# Patient Record
Sex: MALE | Race: WHITE | NOT HISPANIC OR LATINO | Employment: OTHER | URBAN - METROPOLITAN AREA
[De-identification: names, ages, dates, MRNs, and addresses within clinical notes are randomized per-mention and may not be internally consistent; named-entity substitution may affect disease eponyms.]

---

## 2020-01-13 ENCOUNTER — APPOINTMENT (EMERGENCY)
Dept: RADIOLOGY | Facility: HOSPITAL | Age: 35
End: 2020-01-13
Payer: COMMERCIAL

## 2020-01-13 ENCOUNTER — HOSPITAL ENCOUNTER (EMERGENCY)
Facility: HOSPITAL | Age: 35
Discharge: HOME | End: 2020-01-13
Attending: EMERGENCY MEDICINE | Admitting: EMERGENCY MEDICINE
Payer: COMMERCIAL

## 2020-01-13 VITALS
OXYGEN SATURATION: 97 % | WEIGHT: 180 LBS | SYSTOLIC BLOOD PRESSURE: 132 MMHG | DIASTOLIC BLOOD PRESSURE: 71 MMHG | RESPIRATION RATE: 16 BRPM | BODY MASS INDEX: 25.83 KG/M2 | TEMPERATURE: 98.1 F | HEART RATE: 87 BPM

## 2020-01-13 DIAGNOSIS — J10.1 INFLUENZA B: Primary | ICD-10-CM

## 2020-01-13 LAB
ALBUMIN SERPL BCP-MCNC: 3.8 G/DL (ref 3.5–5)
ALP SERPL-CCNC: 47 U/L (ref 46–116)
ALT SERPL W P-5'-P-CCNC: 28 U/L (ref 12–78)
ANION GAP SERPL CALCULATED.3IONS-SCNC: 7 MMOL/L (ref 4–13)
AST SERPL W P-5'-P-CCNC: 25 U/L (ref 5–45)
BASOPHILS # BLD AUTO: 0.04 THOUSANDS/ΜL (ref 0–0.1)
BASOPHILS NFR BLD AUTO: 1 % (ref 0–1)
BILIRUB SERPL-MCNC: 0.28 MG/DL (ref 0.2–1)
BUN SERPL-MCNC: 16 MG/DL (ref 5–25)
CALCIUM SERPL-MCNC: 8.9 MG/DL (ref 8.3–10.1)
CHLORIDE SERPL-SCNC: 105 MMOL/L (ref 100–108)
CO2 SERPL-SCNC: 26 MMOL/L (ref 21–32)
CREAT SERPL-MCNC: 1.1 MG/DL (ref 0.6–1.3)
EOSINOPHIL # BLD AUTO: 0.02 THOUSAND/ΜL (ref 0–0.61)
EOSINOPHIL NFR BLD AUTO: 0 % (ref 0–6)
ERYTHROCYTE [DISTWIDTH] IN BLOOD BY AUTOMATED COUNT: 12.8 % (ref 11.6–15.1)
FLUAV RNA NPH QL NAA+PROBE: ABNORMAL
FLUBV RNA NPH QL NAA+PROBE: DETECTED
GFR SERPL CREATININE-BSD FRML MDRD: 86 ML/MIN/1.73SQ M
GLUCOSE SERPL-MCNC: 121 MG/DL (ref 65–140)
HCT VFR BLD AUTO: 37.3 % (ref 36.5–49.3)
HGB BLD-MCNC: 12.9 G/DL (ref 12–17)
IMM GRANULOCYTES # BLD AUTO: 0.01 THOUSAND/UL (ref 0–0.2)
IMM GRANULOCYTES NFR BLD AUTO: 0 % (ref 0–2)
LYMPHOCYTES # BLD AUTO: 0.5 THOUSANDS/ΜL (ref 0.6–4.47)
LYMPHOCYTES NFR BLD AUTO: 9 % (ref 14–44)
MCH RBC QN AUTO: 31.5 PG (ref 26.8–34.3)
MCHC RBC AUTO-ENTMCNC: 34.6 G/DL (ref 31.4–37.4)
MCV RBC AUTO: 91 FL (ref 82–98)
MONOCYTES # BLD AUTO: 0.94 THOUSAND/ΜL (ref 0.17–1.22)
MONOCYTES NFR BLD AUTO: 17 % (ref 4–12)
NEUTROPHILS # BLD AUTO: 3.9 THOUSANDS/ΜL (ref 1.85–7.62)
NEUTS SEG NFR BLD AUTO: 73 % (ref 43–75)
NRBC BLD AUTO-RTO: 0 /100 WBCS
PLATELET # BLD AUTO: 218 THOUSANDS/UL (ref 149–390)
PMV BLD AUTO: 11 FL (ref 8.9–12.7)
POTASSIUM SERPL-SCNC: 3.9 MMOL/L (ref 3.5–5.3)
PROT SERPL-MCNC: 7.1 G/DL (ref 6.4–8.2)
RBC # BLD AUTO: 4.09 MILLION/UL (ref 3.88–5.62)
RSV RNA NPH QL NAA+PROBE: ABNORMAL
SODIUM SERPL-SCNC: 138 MMOL/L (ref 136–145)
WBC # BLD AUTO: 5.41 THOUSAND/UL (ref 4.31–10.16)

## 2020-01-13 PROCEDURE — 36415 COLL VENOUS BLD VENIPUNCTURE: CPT | Performed by: PHYSICIAN ASSISTANT

## 2020-01-13 PROCEDURE — 96360 HYDRATION IV INFUSION INIT: CPT

## 2020-01-13 PROCEDURE — 87631 RESP VIRUS 3-5 TARGETS: CPT | Performed by: PHYSICIAN ASSISTANT

## 2020-01-13 PROCEDURE — 71046 X-RAY EXAM CHEST 2 VIEWS: CPT

## 2020-01-13 PROCEDURE — 85025 COMPLETE CBC W/AUTO DIFF WBC: CPT | Performed by: PHYSICIAN ASSISTANT

## 2020-01-13 PROCEDURE — 99283 EMERGENCY DEPT VISIT LOW MDM: CPT

## 2020-01-13 PROCEDURE — 99284 EMERGENCY DEPT VISIT MOD MDM: CPT | Performed by: PHYSICIAN ASSISTANT

## 2020-01-13 PROCEDURE — 87040 BLOOD CULTURE FOR BACTERIA: CPT | Performed by: PHYSICIAN ASSISTANT

## 2020-01-13 PROCEDURE — 80053 COMPREHEN METABOLIC PANEL: CPT | Performed by: PHYSICIAN ASSISTANT

## 2020-01-13 RX ORDER — OSELTAMIVIR PHOSPHATE 75 MG/1
75 CAPSULE ORAL 2 TIMES DAILY
Qty: 10 CAPSULE | Refills: 0 | Status: SHIPPED | OUTPATIENT
Start: 2020-01-13 | End: 2020-01-13 | Stop reason: SDUPTHER

## 2020-01-13 RX ORDER — ONDANSETRON 4 MG/1
4 TABLET, ORALLY DISINTEGRATING ORAL EVERY 8 HOURS PRN
Qty: 15 TABLET | Refills: 0 | Status: SHIPPED | OUTPATIENT
Start: 2020-01-13

## 2020-01-13 RX ORDER — ONDANSETRON 4 MG/1
4 TABLET, ORALLY DISINTEGRATING ORAL EVERY 8 HOURS PRN
Qty: 15 TABLET | Refills: 0 | Status: SHIPPED | OUTPATIENT
Start: 2020-01-13 | End: 2020-01-13 | Stop reason: SDUPTHER

## 2020-01-13 RX ORDER — OSELTAMIVIR PHOSPHATE 75 MG/1
75 CAPSULE ORAL 2 TIMES DAILY
Qty: 10 CAPSULE | Refills: 0 | Status: SHIPPED | OUTPATIENT
Start: 2020-01-13 | End: 2020-01-18

## 2020-01-13 RX ADMIN — SODIUM CHLORIDE 1000 ML: 0.9 INJECTION, SOLUTION INTRAVENOUS at 08:28

## 2020-01-13 NOTE — ED PROVIDER NOTES
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  No known exposure to the flu  History provided by:  Patient and spouse   used: No    URI   Presenting symptoms: congestion, cough, fever and sore throat    Presenting symptoms: no ear pain, no fatigue and no rhinorrhea    Severity:  Moderate  Onset quality:  Gradual  Duration:  1 day  Timing:  Intermittent  Progression:  Waxing and waning  Chronicity:  New  Relieved by:  OTC medications  Associated symptoms: no arthralgias, no headaches, no myalgias, no neck pain, no sinus pain, no sneezing, no swollen glands and no wheezing        None       History reviewed  No pertinent past medical history  Past Surgical History:   Procedure Laterality Date    ABDOMINAL SURGERY      EYE SURGERY      SPLENECTOMY, TOTAL         History reviewed  No pertinent family history  I have reviewed and agree with the history as documented  Social History     Tobacco Use    Smoking status: Never Smoker    Smokeless tobacco: Never Used   Substance Use Topics    Alcohol use: Yes     Alcohol/week: 4 0 standard drinks     Types: 4 Standard drinks or equivalent per week     Comment: week    Drug use: No        Review of Systems   Constitutional: Positive for fever  Negative for chills and fatigue  HENT: Positive for congestion and sore throat  Negative for ear pain, rhinorrhea, sinus pain and sneezing  Respiratory: Positive for cough  Negative for wheezing  Cardiovascular: Negative for chest pain  Musculoskeletal: Negative for arthralgias, back pain, myalgias and neck pain  Skin: Negative for rash and wound  Neurological: Negative for headaches  All other systems reviewed and are negative  Physical Exam  Physical Exam   Constitutional: He is oriented to person, place, and time  He appears well-developed and well-nourished  No distress  HENT:   Head: Normocephalic and atraumatic  Right Ear: External ear normal    Left Ear: External ear normal    Mouth/Throat: Posterior oropharyngeal erythema present  No oropharyngeal exudate  Tonsils are 1+ on the right  Tonsils are 1+ on the left  No tonsillar exudate  Eyes: Conjunctivae are normal    Neck: No JVD present  No tracheal deviation present  Cardiovascular: Normal rate, regular rhythm and normal heart sounds  Exam reveals no gallop and no friction rub  No murmur heard  Pulmonary/Chest: Effort normal and breath sounds normal  No respiratory distress  He has no wheezes  He has no rales  He exhibits no tenderness  Abdominal: Soft  Bowel sounds are normal  He exhibits no distension  There is no tenderness  There is no guarding  Musculoskeletal: Normal range of motion  He exhibits no edema, tenderness or deformity  Lumbar back: He exhibits normal range of motion, no tenderness, no bony tenderness, no swelling, no edema, no deformity, no laceration, no pain and no spasm  Lymphadenopathy:     He has no cervical adenopathy  Neurological: He is alert and oriented to person, place, and time  Skin: Skin is warm and dry  No rash noted  He is not diaphoretic  No erythema  Psychiatric: He has a normal mood and affect  His behavior is normal    Nursing note and vitals reviewed        Vital Signs  ED Triage Vitals   Temperature Pulse Respirations Blood Pressure SpO2   01/13/20 0752 01/13/20 0753 01/13/20 0753 01/13/20 0753 01/13/20 0753   98 1 °F (36 7 °C) 87 16 132/71 97 %      Temp src Heart Rate Source Patient Position - Orthostatic VS BP Location FiO2 (%)   -- 01/13/20 0753 01/13/20 0753 01/13/20 0753 --    Monitor Lying Right arm       Pain Score       01/13/20 0753       8           Vitals:    01/13/20 0753   BP: 132/71   Pulse: 87   Patient Position - Orthostatic VS: Lying         Visual Acuity      ED Medications  Medications   sodium chloride 0 9 % bolus 1,000 mL (0 mL Intravenous Stopped 1/13/20 0933)       Diagnostic Studies  Results Reviewed     Procedure Component Value Units Date/Time    Blood culture #1 [[id number]] Collected:  01/13/20 2914    Lab Status:  Preliminary result Specimen:  Blood from Arm, Left Updated:  01/13/20 1401     Blood Culture Received in Microbiology Lab  Culture in Progress  Blood culture #2 [[id number]] Collected:  01/13/20 0827    Lab Status:  Preliminary result Specimen:  Blood from Arm, Right Updated:  01/13/20 1401     Blood Culture Received in Microbiology Lab  Culture in Progress      Influenza A/B and RSV PCR [[id number]]  (Abnormal) Collected:  01/13/20 0827    Lab Status:  Final result Specimen:  Nasopharyngeal Swab Updated:  01/13/20 0907     INFLUENZA A PCR None Detected     INFLUENZA B PCR Detected     RSV PCR None Detected    Comprehensive metabolic panel [[id number]] Collected:  01/13/20 0827    Lab Status:  Final result Specimen:  Blood from Arm, Right Updated:  01/13/20 0904     Sodium 138 mmol/L      Potassium 3 9 mmol/L      Chloride 105 mmol/L      CO2 26 mmol/L      ANION GAP 7 mmol/L      BUN 16 mg/dL      Creatinine 1 10 mg/dL      Glucose 121 mg/dL      Calcium 8 9 mg/dL      AST 25 U/L      ALT 28 U/L      Alkaline Phosphatase 47 U/L      Total Protein 7 1 g/dL      Albumin 3 8 g/dL      Total Bilirubin 0 28 mg/dL      eGFR 86 ml/min/1 73sq m     Narrative:       [place] guidelines for Chronic Kidney Disease (CKD):     Stage 1 with normal or high GFR (GFR > 90 mL/min/1 73 square meters)    Stage 2 Mild CKD (GFR = 60-89 mL/min/1 73 square meters)    Stage 3A Moderate CKD (GFR = 45-59 mL/min/1 73 square meters)    Stage 3B Moderate CKD (GFR = 30-44 mL/min/1 73 square meters)    Stage 4 Severe CKD (GFR = 15-29 mL/min/1 73 square meters)    Stage 5 End Stage CKD (GFR <15 mL/min/1 73 square meters)  Note: GFR calculation is accurate only with a steady state creatinine    CBC and differential [[id number]]  (Abnormal) Collected:  01/13/20 0827    Lab Status:  Final result Specimen:  Blood from Arm, Right Updated:  01/13/20 0835     WBC 5 41 Thousand/uL      RBC 4 09 Million/uL      Hemoglobin 12 9 g/dL      Hematocrit 37 3 %      MCV 91 fL      MCH 31 5 pg      MCHC 34 6 g/dL      RDW 12 8 %      MPV 11 0 fL      Platelets 895 Thousands/uL      nRBC 0 /100 WBCs      Neutrophils Relative 73 %      Immat GRANS % 0 %      Lymphocytes Relative 9 %      Monocytes Relative 17 %      Eosinophils Relative 0 %      Basophils Relative 1 %      Neutrophils Absolute 3 90 Thousands/µL      Immature Grans Absolute 0 01 Thousand/uL      Lymphocytes Absolute 0 50 Thousands/µL      Monocytes Absolute 0 94 Thousand/µL      Eosinophils Absolute 0 02 Thousand/µL      Basophils Absolute 0 04 Thousands/µL                  XR chest 2 views   Final Result by Mignon Lepe MD (01/13 1714)      No acute cardiopulmonary disease  Workstation performed: [id number]                    Procedures  Procedures         ED Course                               MDM  Number of Diagnoses or Management Options  Influenza B:   Diagnosis management comments: Differential diagnosis includes but not limited to: Influenza, upper respiratory infection, bronchitis, pneumonia    Sepsis less likely         Amount and/or Complexity of Data Reviewed  Clinical lab tests: reviewed and ordered  Tests in the radiology section of CPT®: reviewed and ordered  Independent visualization of images, tracings, or specimens: yes          Disposition  Final diagnoses:   Influenza B     Time reflects when diagnosis was documented in both MDM as applicable and the Disposition within this note     Time User Action Codes Description Comment    1/13/2020  9:30 AM Ca Ordonez Add [J10 1] Influenza B       ED Disposition     ED Disposition Condition Date/Time Comment    Discharge Stable Mon Jan 13, 2020  9:30 AM Ronel Reddy discharge to home/self care  Follow-up Information    None         Discharge Medication List as of 1/13/2020  9:31 AM      START taking these medications    Details   ondansetron (ZOFRAN ODT) 4 mg disintegrating tablet Take 1 tablet (4 mg total) by mouth every 8 (eight) hours as needed for nausea for up to 15 doses, Starting Mon 1/13/2020, Normal      oseltamivir (TAMIFLU) 75 mg capsule Take 1 capsule (75 mg total) by mouth 2 (two) times a day for 5 days, Starting Mon 1/13/2020, Until Sat 1/18/2020, Normal           No discharge procedures on file      ED Provider  Electronically Signed by           Sandra Hadley PA-C  01/13/20 9012

## 2020-01-15 ENCOUNTER — HOSPITAL ENCOUNTER (EMERGENCY)
Facility: HOSPITAL | Age: 35
Discharge: HOME | End: 2020-01-15
Attending: EMERGENCY MEDICINE | Admitting: EMERGENCY MEDICINE
Payer: COMMERCIAL

## 2020-01-15 VITALS
HEIGHT: 71 IN | HEART RATE: 64 BPM | WEIGHT: 180 LBS | RESPIRATION RATE: 17 BRPM | SYSTOLIC BLOOD PRESSURE: 125 MMHG | TEMPERATURE: 99.3 F | BODY MASS INDEX: 25.2 KG/M2 | DIASTOLIC BLOOD PRESSURE: 66 MMHG | OXYGEN SATURATION: 98 %

## 2020-01-15 DIAGNOSIS — E86.0 DEHYDRATION: ICD-10-CM

## 2020-01-15 DIAGNOSIS — J11.1 INFLUENZA: Primary | ICD-10-CM

## 2020-01-15 PROCEDURE — 99283 EMERGENCY DEPT VISIT LOW MDM: CPT

## 2020-01-15 PROCEDURE — 96366 THER/PROPH/DIAG IV INF ADDON: CPT

## 2020-01-15 PROCEDURE — 99283 EMERGENCY DEPT VISIT LOW MDM: CPT | Performed by: EMERGENCY MEDICINE

## 2020-01-15 PROCEDURE — 96365 THER/PROPH/DIAG IV INF INIT: CPT

## 2020-01-15 RX ADMIN — SODIUM CHLORIDE, SODIUM LACTATE, POTASSIUM CHLORIDE, AND CALCIUM CHLORIDE 2000 ML: .6; .31; .03; .02 INJECTION, SOLUTION INTRAVENOUS at 18:54

## 2020-01-17 NOTE — ED PROVIDER NOTES
History  Chief Complaint   Patient presents with    Flu Symptoms     pt states was here on Mon was told if to come back if is dehydrated and still has fever     This is a 37 y o  old male who presents to the ED for evaluation of weakness  Was diagnosed with flu  She does have fever, weakness  States decreased p o  Intake, feels dehydrated  Was told to come back if he felt worse  Cough is at baseline  No shortness of breath or chest pain no nausea vomiting or diarrhea  Is on Tamiflu  Prior to Admission Medications   Prescriptions Last Dose Informant Patient Reported? Taking?   ondansetron (ZOFRAN ODT) 4 mg disintegrating tablet   No No   Sig: Take 1 tablet (4 mg total) by mouth every 8 (eight) hours as needed for nausea for up to 15 doses   oseltamivir (TAMIFLU) 75 mg capsule   No No   Sig: Take 1 capsule (75 mg total) by mouth 2 (two) times a day for 5 days      Facility-Administered Medications: None     History reviewed  No pertinent past medical history  Past Surgical History:   Procedure Laterality Date    ABDOMINAL SURGERY      EYE SURGERY      SPLENECTOMY, TOTAL       History reviewed  No pertinent family history  I have reviewed and agree with the history as documented  Social History     Tobacco Use    Smoking status: Never Smoker    Smokeless tobacco: Never Used   Substance Use Topics    Alcohol use: Yes     Alcohol/week: 4 0 standard drinks     Types: 4 Standard drinks or equivalent per week     Comment: week    Drug use: No      Review of Systems   Constitutional: Positive for fatigue and fever  Negative for chills and unexpected weight change  HENT: Negative for congestion, rhinorrhea and sore throat  Eyes: Negative for redness and visual disturbance  Respiratory: Positive for cough  Negative for shortness of breath  Cardiovascular: Negative for chest pain and leg swelling  Gastrointestinal: Negative for abdominal pain, constipation, diarrhea, nausea and vomiting  Endocrine: Negative for cold intolerance and heat intolerance  Genitourinary: Negative for dysuria, frequency and urgency  Musculoskeletal: Negative for back pain  Skin: Negative for rash  Neurological: Negative for dizziness, syncope and numbness  All other systems reviewed and are negative  Physical Exam  Physical Exam   Constitutional: He is oriented to person, place, and time  He appears well-developed and well-nourished  No distress  HENT:   Head: Normocephalic and atraumatic  Nose: Nose normal    Eyes: Pupils are equal, round, and reactive to light  Conjunctivae and EOM are normal    Neck: Normal range of motion  Neck supple  Cardiovascular: Normal rate, regular rhythm and normal heart sounds  Exam reveals no gallop  No murmur heard  Pulmonary/Chest: Effort normal and breath sounds normal  No respiratory distress  He has no wheezes  He has no rales  Abdominal: Soft  Bowel sounds are normal  He exhibits no distension and no mass  There is no tenderness  There is no rebound and no guarding  Musculoskeletal: Normal range of motion  He exhibits no edema or deformity  Lymphadenopathy:     He has no cervical adenopathy  Neurological: He is alert and oriented to person, place, and time  No cranial nerve deficit  Skin: Skin is warm and dry  No rash noted  He is not diaphoretic  No erythema  Psychiatric: He has a normal mood and affect  Nursing note and vitals reviewed      Vital Signs  ED Triage Vitals   Temperature Pulse Respirations Blood Pressure SpO2   01/15/20 1815 01/15/20 1815 01/15/20 1815 01/15/20 1815 01/15/20 1815   99 3 °F (37 4 °C) 74 18 124/85 98 %      Temp Source Heart Rate Source Patient Position - Orthostatic VS BP Location FiO2 (%)   01/15/20 1815 01/15/20 2148 01/15/20 2148 01/15/20 2148 --   Oral Monitor Sitting Left arm       Pain Score       01/15/20 1815       8         ED Medications  Medications   lactated ringers bolus 2,000 mL (0 mL Intravenous Stopped 1/15/20 7386)     Diagnostic Studies  Results Reviewed     None        No orders to display          Procedures  Procedures    ED Course     A/P: This is a 37 y o  male who presents to the ED for evaluation of weakness  Has Flu  Will give IVF, return to home  I personally discussed return precautions with this patient  I provided the patient with written discharge instructions and particularly highlighted specific areas of interest to this patient, including but not limited to: medications for symptom managment, follow up recommendations, and return precautions  Patient is in agreement with this plan as outlined above  MDM    Disposition  Final diagnoses:   Influenza   Dehydration     Time reflects when diagnosis was documented in both MDM as applicable and the Disposition within this note     Time User Action Codes Description Comment    1/15/2020 10:01 PM Marj Bell Add [J11 1] Influenza     1/15/2020 10:01 PM Marj Bell Add [E86 0] Dehydration       ED Disposition     ED Disposition Condition Date/Time Comment    Discharge Stable Wed Richard 15, 2020 10:01 PM Yolette Adame discharge to home/self care  Follow-up Information     Follow up With Specialties Details Why Contact Info    your family doctor              Discharge Medication List as of 1/15/2020 10:01 PM      CONTINUE these medications which have NOT CHANGED    Details   ondansetron (ZOFRAN ODT) 4 mg disintegrating tablet Take 1 tablet (4 mg total) by mouth every 8 (eight) hours as needed for nausea for up to 15 doses, Starting Mon 1/13/2020, Normal      oseltamivir (TAMIFLU) 75 mg capsule Take 1 capsule (75 mg total) by mouth 2 (two) times a day for 5 days, Starting Mon 1/13/2020, Until Sat 1/18/2020, Normal           No discharge procedures on file      ED Provider  Electronically Signed by           Chris Mason MD  01/17/20 0970

## 2020-01-18 LAB
BACTERIA BLD CULT: NORMAL
BACTERIA BLD CULT: NORMAL

## 2021-03-19 ENCOUNTER — IMMUNIZATIONS (OUTPATIENT)
Dept: FAMILY MEDICINE CLINIC | Facility: HOSPITAL | Age: 36
End: 2021-03-19

## 2021-03-19 DIAGNOSIS — Z23 ENCOUNTER FOR IMMUNIZATION: Primary | ICD-10-CM

## 2021-03-19 PROCEDURE — 0011A SARS-COV-2 / COVID-19 MRNA VACCINE (MODERNA) 100 MCG: CPT

## 2021-03-19 PROCEDURE — 91301 SARS-COV-2 / COVID-19 MRNA VACCINE (MODERNA) 100 MCG: CPT

## 2021-04-21 ENCOUNTER — IMMUNIZATIONS (OUTPATIENT)
Dept: FAMILY MEDICINE CLINIC | Facility: HOSPITAL | Age: 36
End: 2021-04-21

## 2021-04-21 DIAGNOSIS — Z23 ENCOUNTER FOR IMMUNIZATION: Primary | ICD-10-CM

## 2021-04-21 PROCEDURE — 0012A SARS-COV-2 / COVID-19 MRNA VACCINE (MODERNA) 100 MCG: CPT

## 2021-04-21 PROCEDURE — 91301 SARS-COV-2 / COVID-19 MRNA VACCINE (MODERNA) 100 MCG: CPT

## 2024-09-22 ENCOUNTER — APPOINTMENT (EMERGENCY)
Dept: RADIOLOGY | Facility: HOSPITAL | Age: 39
End: 2024-09-22
Payer: COMMERCIAL

## 2024-09-22 ENCOUNTER — HOSPITAL ENCOUNTER (EMERGENCY)
Facility: HOSPITAL | Age: 39
Discharge: HOME | End: 2024-09-22
Attending: EMERGENCY MEDICINE
Payer: COMMERCIAL

## 2024-09-22 VITALS
SYSTOLIC BLOOD PRESSURE: 131 MMHG | RESPIRATION RATE: 18 BRPM | HEART RATE: 79 BPM | DIASTOLIC BLOOD PRESSURE: 75 MMHG | TEMPERATURE: 98 F | OXYGEN SATURATION: 97 %

## 2024-09-22 DIAGNOSIS — S60.222A CONTUSION OF DORSUM OF LEFT HAND: Primary | ICD-10-CM

## 2024-09-22 PROCEDURE — 73130 X-RAY EXAM OF HAND: CPT

## 2024-09-22 PROCEDURE — 99284 EMERGENCY DEPT VISIT MOD MDM: CPT | Performed by: EMERGENCY MEDICINE

## 2024-09-22 PROCEDURE — 99283 EMERGENCY DEPT VISIT LOW MDM: CPT

## 2024-09-22 PROCEDURE — 73110 X-RAY EXAM OF WRIST: CPT

## 2024-09-22 RX ORDER — ACETAMINOPHEN 325 MG/1
975 TABLET ORAL ONCE
Status: COMPLETED | OUTPATIENT
Start: 2024-09-22 | End: 2024-09-22

## 2024-09-22 RX ORDER — IBUPROFEN 600 MG/1
600 TABLET, FILM COATED ORAL ONCE
Status: COMPLETED | OUTPATIENT
Start: 2024-09-22 | End: 2024-09-22

## 2024-09-22 RX ADMIN — ACETAMINOPHEN 325MG 975 MG: 325 TABLET ORAL at 11:02

## 2024-09-22 RX ADMIN — IBUPROFEN 600 MG: 600 TABLET, FILM COATED ORAL at 11:02
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
## 2024-12-12 ENCOUNTER — HOSPITAL ENCOUNTER (EMERGENCY)
Facility: HOSPITAL | Age: 39
Discharge: HOME | End: 2024-12-12
Attending: EMERGENCY MEDICINE
Payer: COMMERCIAL

## 2024-12-12 VITALS
DIASTOLIC BLOOD PRESSURE: 75 MMHG | HEART RATE: 69 BPM | SYSTOLIC BLOOD PRESSURE: 135 MMHG | OXYGEN SATURATION: 98 % | TEMPERATURE: 98.2 F | RESPIRATION RATE: 18 BRPM

## 2024-12-12 DIAGNOSIS — S01.81XA FACIAL LACERATION, INITIAL ENCOUNTER: Primary | ICD-10-CM

## 2024-12-12 PROCEDURE — 12011 RPR F/E/E/N/L/M 2.5 CM/<: CPT

## 2024-12-12 PROCEDURE — 99283 EMERGENCY DEPT VISIT LOW MDM: CPT

## 2024-12-12 PROCEDURE — 99284 EMERGENCY DEPT VISIT MOD MDM: CPT

## 2024-12-12 RX ORDER — LIDOCAINE HYDROCHLORIDE 10 MG/ML
5 INJECTION, SOLUTION EPIDURAL; INFILTRATION; INTRACAUDAL; PERINEURAL ONCE
Status: COMPLETED | OUTPATIENT
Start: 2024-12-12 | End: 2024-12-12

## 2024-12-12 RX ORDER — GINSENG 100 MG
1 CAPSULE ORAL 2 TIMES DAILY
Qty: 28 G | Refills: 0 | Status: SHIPPED | OUTPATIENT
Start: 2024-12-12

## 2024-12-12 RX ADMIN — LIDOCAINE HYDROCHLORIDE 5 ML: 10 INJECTION, SOLUTION EPIDURAL; INFILTRATION; INTRACAUDAL; PERINEURAL at 18:24

## 2024-12-13 NOTE — ED PROVIDER NOTES
"Time reflects when diagnosis was documented in both MDM as applicable and the Disposition within this note       Time User Action Codes Description Comment    12/12/2024  6:39 PM Mac Paulson Add [S01.81XA] Facial laceration, initial encounter           ED Disposition       ED Disposition   Discharge    Condition   Stable    Date/Time   Thu Dec 12, 2024  6:39 PM    Comment   Tarik BOUDREAUX Lesly discharge to home/self care.                   Assessment & Plan       Medical Decision Making  39-year-old male presents today after getting hit in the face with a softball  earlier today.  States that he has lacerations with left eye.  Laceration was closed as above, tetanus shot was already updated a year ago.  Return in 5 days for removal.  Bacitracin sent over to patient's pharmacy.  ------------------------------------------------------------  Strict return precautions discussed. Patient at time of discharge well-appearing in no acute distress, all questions answered. Patient agreeable to plan.  Patient's vitals, lab/imaging results, diagnosis, and treatment plan were discussed with the patient. All new/changed medications were discussed with patient, specifically, route of administration, how often and when to take, and where they can be picked up. Strict return precautions as well as close follow up with PCP was discussed with the patient and the patient was agreeable to my recommendations.  Patient verbally acknowledged understanding of the above communications. All labs reviewed and utilized in the medical decision making process (if labs were ordered). Portions of the record may have been created with voice recognition software.  Occasional wrong word or \"sound a like\" substitutions may have occurred due to the inherent limitations of voice recognition software.  Read the chart carefully and recognize, using context, where substitutions have occurred.      Risk  OTC drugs.  Prescription drug " management.        ED Course as of 12/12/24 2015   Thu Dec 12, 2024   1839 3 sutures placed       Medications   lidocaine (PF) (XYLOCAINE-MPF) 1 % injection 5 mL (5 mL Infiltration Given 12/12/24 1824)       ED Risk Strat Scores                                              History of Present Illness       Chief Complaint   Patient presents with    Facial Injury     Hit in left eye with soft ball laceration above left eye. Pr pt tetanus up-to-date        No past medical history on file.   Past Surgical History:   Procedure Laterality Date    ABDOMINAL SURGERY      EYE SURGERY      SPLENECTOMY, TOTAL        No family history on file.   Social History     Tobacco Use    Smoking status: Never    Smokeless tobacco: Never   Substance Use Topics    Alcohol use: Yes     Alcohol/week: 4.0 standard drinks of alcohol     Types: 4 Standard drinks or equivalent per week     Comment: week    Drug use: No      E-Cigarette/Vaping      E-Cigarette/Vaping Substances      I have reviewed and agree with the history as documented.     39-year-old male presents today with concerns of left eye laceration.  States that it happened because of a softball hit his eye.  States that it does feel somewhat swollen in the upper part but he has no visual changes.  States it does hurt when he presses on the area of but does not have any pain with eye movements.  No other injuries.  Denies any blood thinners.  No headache.  No seizures/lightheadedness/syncope.  No nausea or vomiting.  No other symptoms        Review of Systems   Constitutional:  Negative for chills and fever.   HENT:  Negative for ear pain and sore throat.    Eyes:  Negative for pain and visual disturbance.   Respiratory:  Negative for cough and shortness of breath.    Cardiovascular:  Negative for chest pain and palpitations.   Gastrointestinal:  Negative for abdominal pain and vomiting.   Genitourinary:  Negative for dysuria and hematuria.   Musculoskeletal:  Negative for  arthralgias and back pain.   Skin:  Positive for wound. Negative for color change and rash.   Neurological:  Negative for seizures and syncope.   All other systems reviewed and are negative.          Objective       ED Triage Vitals [12/12/24 1748]   Temperature Pulse Blood Pressure Respirations SpO2 Patient Position - Orthostatic VS   98.2 °F (36.8 °C) 69 135/75 18 98 % Sitting      Temp src Heart Rate Source BP Location FiO2 (%) Pain Score    -- Monitor Right arm -- --      Vitals      Date and Time Temp Pulse SpO2 Resp BP Pain Score FACES Pain Rating User   12/12/24 1748 98.2 °F (36.8 °C) 69 98 % 18 135/75 -- -- SG            Physical Exam  Vitals and nursing note reviewed.   Constitutional:       General: He is not in acute distress.  HENT:      Head: Normocephalic.      Comments: Left upper eyelid laceration, approximately 2 cm.  Bleeding controlled with pressure.  Eyes:      General: No scleral icterus.     Conjunctiva/sclera: Conjunctivae normal.      Pupils: Pupils are equal, round, and reactive to light.   Cardiovascular:      Rate and Rhythm: Normal rate and regular rhythm.      Pulses: Normal pulses.   Pulmonary:      Effort: Pulmonary effort is normal. No respiratory distress.   Abdominal:      Tenderness: There is no abdominal tenderness.   Musculoskeletal:         General: Normal range of motion.      Cervical back: Normal range of motion.   Skin:     General: Skin is warm and dry.      Capillary Refill: Capillary refill takes less than 2 seconds.      Coloration: Skin is not jaundiced.   Neurological:      Mental Status: He is alert and oriented to person, place, and time.         Results Reviewed       None            No orders to display       Universal Protocol:  procedure performed by consultantConsent: Verbal consent obtained.  Risks and benefits: risks, benefits and alternatives were discussed  Consent given by: patient  Patient understanding: patient states understanding of the procedure being  performed  Patient consent: the patient's understanding of the procedure matches consent given  Procedure consent: procedure consent matches procedure scheduled  Required items: required blood products, implants, devices, and special equipment available  Patient identity confirmed: verbally with patient  Laceration repair    Date/Time: 12/12/2024 8:14 PM    Performed by: Mac Paulson PA-C  Authorized by: Mac Paulson PA-C  Body area: head/neck  Location details: left eyelid  Laceration length: 2 cm  Foreign bodies: no foreign bodies  Tendon involvement: none  Nerve involvement: none  Vascular damage: no  Anesthesia: local infiltration    Anesthesia:  Local Anesthetic: lidocaine 1% without epinephrine  Anesthetic total: 1 mL    Wound Dehiscence:  Superficial Wound Dehiscence: simple closure      Procedure Details:  Preparation: Patient was prepped and draped in the usual sterile fashion.  Irrigation solution: saline  Irrigation method: syringe  Amount of cleaning: standard  Debridement: none  Degree of undermining: none  Skin closure: 5-0 nylon  Number of sutures: 3  Technique: simple  Approximation: close  Approximation difficulty: simple  Patient tolerance: patient tolerated the procedure well with no immediate complications          ED Medication and Procedure Management   Prior to Admission Medications   Prescriptions Last Dose Informant Patient Reported? Taking?   ondansetron (ZOFRAN ODT) 4 mg disintegrating tablet   No No   Sig: Take 1 tablet (4 mg total) by mouth every 8 (eight) hours as needed for nausea for up to 15 doses      Facility-Administered Medications: None     Discharge Medication List as of 12/12/2024  6:40 PM        START taking these medications    Details   bacitracin topical ointment 500 units/g topical ointment Apply 1 large application topically 2 (two) times a day, Starting Thu 12/12/2024, Normal           CONTINUE these medications which have NOT CHANGED    Details   ondansetron  (ZOFRAN ODT) 4 mg disintegrating tablet Take 1 tablet (4 mg total) by mouth every 8 (eight) hours as needed for nausea for up to 15 doses, Starting Mon 1/13/2020, Normal           No discharge procedures on file.  ED SEPSIS DOCUMENTATION   Time reflects when diagnosis was documented in both MDM as applicable and the Disposition within this note       Time User Action Codes Description Comment    12/12/2024  6:39 PM Mac Paulson Add [S01.81XA] Facial laceration, initial encounter                  Mac Paulson PA-C  12/12/24 2015

## 2025-06-18 ENCOUNTER — HOSPITAL ENCOUNTER (EMERGENCY)
Facility: HOSPITAL | Age: 40
Discharge: HOME | End: 2025-06-18
Attending: EMERGENCY MEDICINE
Payer: OTHER MISCELLANEOUS

## 2025-06-18 VITALS
HEART RATE: 82 BPM | OXYGEN SATURATION: 97 % | WEIGHT: 181.88 LBS | RESPIRATION RATE: 18 BRPM | BODY MASS INDEX: 25.46 KG/M2 | SYSTOLIC BLOOD PRESSURE: 134 MMHG | TEMPERATURE: 98.5 F | DIASTOLIC BLOOD PRESSURE: 79 MMHG | HEIGHT: 71 IN

## 2025-06-18 DIAGNOSIS — H57.8A1 FOREIGN BODY SENSATION, RIGHT EYE: ICD-10-CM

## 2025-06-18 DIAGNOSIS — S05.01XA ABRASION OF RIGHT CORNEA, INITIAL ENCOUNTER: Primary | ICD-10-CM

## 2025-06-18 PROCEDURE — 99284 EMERGENCY DEPT VISIT MOD MDM: CPT | Performed by: EMERGENCY MEDICINE

## 2025-06-18 PROCEDURE — 99282 EMERGENCY DEPT VISIT SF MDM: CPT

## 2025-06-18 RX ORDER — IBUPROFEN 600 MG/1
600 TABLET, FILM COATED ORAL ONCE
Status: COMPLETED | OUTPATIENT
Start: 2025-06-18 | End: 2025-06-18

## 2025-06-18 RX ORDER — ERYTHROMYCIN 5 MG/G
0.5 OINTMENT OPHTHALMIC ONCE
Status: COMPLETED | OUTPATIENT
Start: 2025-06-18 | End: 2025-06-18

## 2025-06-18 RX ORDER — OXYCODONE HYDROCHLORIDE 5 MG/1
5 TABLET ORAL EVERY 6 HOURS PRN
Qty: 10 TABLET | Refills: 0 | Status: SHIPPED | OUTPATIENT
Start: 2025-06-18

## 2025-06-18 RX ORDER — ERYTHROMYCIN 5 MG/G
OINTMENT OPHTHALMIC
Qty: 3.5 G | Refills: 0 | Status: SHIPPED | OUTPATIENT
Start: 2025-06-18

## 2025-06-18 RX ORDER — TETRACAINE HYDROCHLORIDE 5 MG/ML
2 SOLUTION OPHTHALMIC ONCE
Status: COMPLETED | OUTPATIENT
Start: 2025-06-18 | End: 2025-06-18

## 2025-06-18 RX ORDER — ACETAMINOPHEN 325 MG/1
975 TABLET ORAL ONCE
Status: COMPLETED | OUTPATIENT
Start: 2025-06-18 | End: 2025-06-18

## 2025-06-18 RX ADMIN — TETRACAINE HYDROCHLORIDE 2 DROP: 5 SOLUTION OPHTHALMIC at 21:48

## 2025-06-18 RX ADMIN — IBUPROFEN 600 MG: 600 TABLET, FILM COATED ORAL at 22:43

## 2025-06-18 RX ADMIN — ACETAMINOPHEN 975 MG: 325 TABLET ORAL at 22:43

## 2025-06-18 RX ADMIN — FLUORESCEIN SODIUM 1 STRIP: 1 STRIP OPHTHALMIC at 21:48

## 2025-06-18 RX ADMIN — ERYTHROMYCIN 0.5 INCH: 5 OINTMENT OPHTHALMIC at 22:44

## 2025-06-18 NOTE — Clinical Note
Tarik Grace was seen and treated in our emergency department on 6/18/2025.    No restrictions            Diagnosis:     Tarik  may return to work on return date.    He may return on this date: 06/20/2025         If you have any questions or concerns, please don't hesitate to call.      Bethel Moralez PA-C    ______________________________           _______________          _______________  Hospital Representative                              Date                                Time

## 2025-06-19 ENCOUNTER — OFFICE VISIT (OUTPATIENT)
Age: 40
End: 2025-06-19
Payer: OTHER MISCELLANEOUS

## 2025-06-19 DIAGNOSIS — H57.8A1 FOREIGN BODY SENSATION, RIGHT EYE: ICD-10-CM

## 2025-06-19 DIAGNOSIS — S05.01XA ABRASION OF RIGHT CORNEA, INITIAL ENCOUNTER: Primary | ICD-10-CM

## 2025-06-19 PROCEDURE — 99203 OFFICE O/P NEW LOW 30 MIN: CPT | Performed by: OPHTHALMOLOGY

## 2025-06-19 NOTE — ED PROVIDER NOTES
Time reflects when diagnosis was documented in both MDM as applicable and the Disposition within this note       Time User Action Codes Description Comment    6/18/2025 10:30 PM Bethel Moralez Add [S05.01XA] Abrasion of right cornea, initial encounter     6/18/2025 10:30 PM Bethel Moralez Add [H57.8A1] Foreign body sensation, right eye           ED Disposition       ED Disposition   Discharge    Condition   Stable    Date/Time   Wed Jun 18, 2025 10:30 PM    Comment   Tarik BOUDREAUX Trach discharge to home/self care.                   Assessment & Plan       Medical Decision Making  40 year old male presenting right eye pain for one day. He states he was working with metal earlier when some of the metal sparked up and got in his right eye. He states he was working with his former retired ophthalmologist who helped wash and remove some of the metal in his eye. He states he has had the sensation of metal still being in his eye with slight progression of pain, has not taken anything prior to arrival. He notes slightly blurred vision. He denies any other symptoms at this time. Last tetanus was 2023.     DDX including but not limited to: corneal abrasion, corneal ulcer, foreign body w/wo impaction    See procedure note. Patient tolerated with difficulty, discussed further attempting to remove possible foreign body vs referral to ophthalmology. With shared decision making, will have patient follow up with ophthalmology. Will treat patient with erythromycin ointment and pain control with Tylenol + Motrin with oxycodone for severe breakthrough pain with strict return precautions. Ambulatory referral placed, patient given phone number to call in AM to schedule appointment. At this time, patient stable for close outpatient follow up with ophthalmology. Prior to discharge, discharge instructions were discussed with patient at bedside. Patient was provided both verbal and written instructions. Patient is understanding of the discharge  instructions and is agreeable to plan of care. Return precautions were discussed with patient bedside, patient verbalized understanding of signs and symptoms that would necessitate return to the ED. All questions were answered. Patient was comfortable with the plan of care and discharged to home.         Problems Addressed:  Abrasion of right cornea, initial encounter: acute illness or injury  Foreign body sensation, right eye: acute illness or injury    Risk  OTC drugs.  Prescription drug management.             Medications   tetracaine 0.5 % ophthalmic solution 2 drop (2 drops Right Eye Given by Other 6/18/25 2148)   fluorescein sodium sterile ophthalmic strip 1 strip (1 strip Right Eye Given by Other 6/18/25 2148)       ED Risk Strat Scores                    No data recorded                            History of Present Illness       Chief Complaint   Patient presents with    Eye Pain     Was working with metal earlier today, and got some of it in his right eye. Was able to take some out but still has some eye irritation and some blurry vision.        Past Medical History[1]   Past Surgical History[2]   Family History[3]   Social History[4]   E-Cigarette/Vaping      E-Cigarette/Vaping Substances      I have reviewed and agree with the history as documented.     40 year old male presenting right eye pain for one day. He states he was working with metal earlier when some of the metal sparked up and got in his right eye. He states he was working with his former retired ophthalmologist who helped wash and remove some of the metal in his eye. He states he has had the sensation of metal still being in his eye with slight progression of pain, has not taken anything prior to arrival. He notes slightly blurred vision. He denies any other symptoms at this time. Last tetanus was 2023.       Eye Pain  Associated symptoms: no chest pain, no congestion, no cough, no fever, no headaches, no shortness of breath and no sore  throat        Review of Systems   Constitutional:  Negative for fever.   HENT:  Negative for congestion and sore throat.    Eyes:  Positive for pain (right).   Respiratory:  Negative for cough and shortness of breath.    Cardiovascular:  Negative for chest pain.   Neurological:  Negative for dizziness, syncope, weakness, light-headedness, numbness and headaches.   Psychiatric/Behavioral: Negative.             Objective       ED Triage Vitals [06/18/25 1943]   Temperature Pulse Blood Pressure Respirations SpO2 Patient Position - Orthostatic VS   98.5 °F (36.9 °C) 82 134/79 18 97 % Sitting      Temp Source Heart Rate Source BP Location FiO2 (%) Pain Score    Oral Monitor Right arm -- --      Vitals      Date and Time Temp Pulse SpO2 Resp BP Pain Score FACES Pain Rating User   06/18/25 1943 98.5 °F (36.9 °C) 82 97 % 18 134/79 -- -- AM            Physical Exam  Vitals reviewed.   Constitutional:       General: He is not in acute distress.     Appearance: Normal appearance.   HENT:      Head: Normocephalic and atraumatic.      Nose: Nose normal.      Mouth/Throat:      Mouth: Mucous membranes are moist.      Pharynx: Oropharynx is clear.     Eyes:      General: Lids are normal. Vision grossly intact.      Extraocular Movements: Extraocular movements intact.      Conjunctiva/sclera: Conjunctivae normal.        Comments: Negative Margaret sign in R eye. Possible foreign body noted at approximately 1 o clock over sclera above the iris. No rust rings noted.      Cardiovascular:      Rate and Rhythm: Normal rate and regular rhythm.      Pulses: Normal pulses.      Heart sounds: Normal heart sounds.   Pulmonary:      Effort: Pulmonary effort is normal.      Breath sounds: Normal breath sounds.   Abdominal:      General: Abdomen is flat. Bowel sounds are normal.      Palpations: Abdomen is soft.     Musculoskeletal:         General: Normal range of motion.      Cervical back: Normal range of motion.     Skin:     General: Skin is  warm and dry.      Capillary Refill: Capillary refill takes less than 2 seconds.     Neurological:      General: No focal deficit present.      Mental Status: He is alert and oriented to person, place, and time. Mental status is at baseline.     Psychiatric:         Mood and Affect: Mood normal.         Behavior: Behavior normal.         Thought Content: Thought content normal.         Results Reviewed       None            No orders to display       Foreign Body - Ocular    Date/Time: 6/18/2025 10:10 PM    Performed by: Bethel Moralez PA-C  Authorized by: Bethel Moralez PA-C    Patient location:  ED  Consent:     Consent obtained:  Verbal    Consent given by:  Patient    Risks discussed:  Bleeding, pain, corneal damage, incomplete removal, visual impairment, damage to surrounding structures and infection    Alternatives discussed:  No treatment, delayed treatment and referral  Universal protocol:     Procedure explained and questions answered to patient or proxy's satisfaction: yes      Patient identity confirmed:  Verbally with patient  Location:     Location:  L corneal  Pre-procedure details:     Imaging:  None    Fluorescein exam: yes      Fluorescein uptake: yes      Dipika test: negative      Corneal abrasion location:  Superior  Anesthesia (see MAR for exact dosages):     Local anesthetic:  Tetracaine drops  Procedure details:     Localization method:  Direct visualization    Intact foreign body removal: no      Residual rust ring observed: no    Post-procedure details:     Dressing:  Antibiotic ointment    Patient tolerance of procedure:  Tolerated with difficulty  Comments:      R eye anesthetized with tetracaine drops. Possible small piece of metal noted at 1 o clock above iris. No dipika sign. Attempted to remove with cotton swab without removal of foreign body. No fluoroscein uptake noted at site of possible foreign body, uptake noted around 12 o clock consistent with corneal abrasion. Patient tolerated  with difficulty.       ED Medication and Procedure Management   Prior to Admission Medications   Prescriptions Last Dose Informant Patient Reported? Taking?   bacitracin topical ointment 500 units/g topical ointment   No No   Sig: Apply 1 large application topically 2 (two) times a day   ondansetron (ZOFRAN ODT) 4 mg disintegrating tablet   No No   Sig: Take 1 tablet (4 mg total) by mouth every 8 (eight) hours as needed for nausea for up to 15 doses      Facility-Administered Medications: None     Discharge Medication List as of 6/18/2025 10:42 PM        START taking these medications    Details   erythromycin (ILOTYCIN) ophthalmic ointment Place a 1/2 inch ribbon of ointment into the lower eyelid four times daily for 5 days., Normal      oxyCODONE (ROXICODONE) 5 immediate release tablet Take 1 tablet (5 mg total) by mouth every 6 (six) hours as needed for moderate pain for up to 10 doses Max Daily Amount: 20 mg, Starting Wed 6/18/2025, Normal           CONTINUE these medications which have NOT CHANGED    Details   bacitracin topical ointment 500 units/g topical ointment Apply 1 large application topically 2 (two) times a day, Starting Thu 12/12/2024, Normal      ondansetron (ZOFRAN ODT) 4 mg disintegrating tablet Take 1 tablet (4 mg total) by mouth every 8 (eight) hours as needed for nausea for up to 15 doses, Starting Mon 1/13/2020, Normal             ED SEPSIS DOCUMENTATION   Time reflects when diagnosis was documented in both MDM as applicable and the Disposition within this note       Time User Action Codes Description Comment    6/18/2025 10:30 PM Bethel Moralez Add [S05.01XA] Abrasion of right cornea, initial encounter     6/18/2025 10:30 PM Bethel Moralez Add [H57.8A1] Foreign body sensation, right eye                    [1] No past medical history on file.  [2]   Past Surgical History:  Procedure Laterality Date    ABDOMINAL SURGERY      EYE SURGERY      SPLENECTOMY, TOTAL     [3] No family history on  file.  [4]   Social History  Tobacco Use    Smoking status: Never    Smokeless tobacco: Never   Substance Use Topics    Alcohol use: Yes     Alcohol/week: 4.0 standard drinks of alcohol     Types: 4 Standard drinks or equivalent per week     Comment: week    Drug use: No        Bethel Moralez PA-C  06/19/25 0529

## 2025-06-19 NOTE — PROGRESS NOTES
Name: Tarik Grace      : 1985      MRN: [id number]  Encounter Provider: Ariane Baptiste MD  Encounter Date: 2025   Encounter department: [facility] OPHTHALMOLOGY  :  Assessment & Plan  Abrasion of right cornea, initial encounter  Doing well; Lubrication recommended (AT OU QID); No signs of FB penetration on dilated fundus examination.  Orders:    Ambulatory Referral to Ophthalmology    Foreign body sensation, right eye    Orders:    Ambulatory Referral to Ophthalmology            Tarik Grace is a 40 y.o. male who presents for FB and corneal abrasion.    Patient was cutting cast iron yesterday morning. Patient states he was wearing safety glasses and metal must have gotten around glasses. 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    Patient went to ER last night at 8. They got some more metal out but not sure if there is residue or metal is deeper in eye.    Patient was given Erythromycin ointment.    Patient states eye is tired and irritation. Vision is slightly blurry.  History obtained from: patient    Review of Systems  Medical History Reviewed by provider this encounter:     .     Past Ocular History: metal FB  Ocular Meds/Drops: Erythromycin ointment  Base Eye Exam       Visual Acuity (Snellen - Linear)         Right Left    Dist sc 20/20-2 20/20              Tonometry (Tonopen, 10:58 AM)         Right Left    Pressure 14 15              Pupils         Pupils APD    Right PERRL None    Left PERRL None              Visual Fields         Left Right     Full Full              Extraocular Movement         Right Left     Full Full              Neuro/Psych       Oriented x3: Yes    Mood/Affect: Normal                  Slit Lamp and Fundus Exam       External Exam         Right Left    External Normal Normal              Slit Lamp Exam         Right Left    Lids/Lashes Normal Normal    Conjunctiva/Sclera White and quiet  White and quiet    Cornea punctate staining Clear    Anterior Chamber Deep and quiet Deep and quiet    Iris Round and reactive Round and reactive    Lens Clear Clear    Anterior Vitreous No PVD, clear, no cell No PVD, clear, no cell              Fundus Exam         Right Left    Disc sharp, no pallor     C/D Ratio 0.25     Macula Good foveal reflex     Vessels normal in caliber     Periphery Flat, no retinal tear or detachment, no masses                       IMAGING:

## 2025-06-19 NOTE — DISCHARGE INSTRUCTIONS
Tylenol 650mg  every 6 hours or Tylenol 1,000 mg every 8 hours as needed for pain.  Ibuprofen(Advil/Motrin) 400-600 mg every 6 hours as needed for pain.   Oxycodone as prescribed as needed for breakthrough pain.  Follow up with ophthalmology within 1-2 days for ER follow up.  Return to ER with worsening eye pain, drainage from the right eye, worsening visual changes, or any other concerning symptoms.